# Patient Record
Sex: MALE | Race: WHITE | NOT HISPANIC OR LATINO | URBAN - METROPOLITAN AREA
[De-identification: names, ages, dates, MRNs, and addresses within clinical notes are randomized per-mention and may not be internally consistent; named-entity substitution may affect disease eponyms.]

---

## 2024-01-11 ENCOUNTER — OFFICE VISIT (OUTPATIENT)
Dept: URBAN - METROPOLITAN AREA CLINIC 80 | Facility: CLINIC | Age: 34
End: 2024-01-11
Payer: COMMERCIAL

## 2024-01-11 ENCOUNTER — DASHBOARD ENCOUNTERS (OUTPATIENT)
Age: 34
End: 2024-01-11

## 2024-01-11 VITALS
HEART RATE: 90 BPM | SYSTOLIC BLOOD PRESSURE: 125 MMHG | DIASTOLIC BLOOD PRESSURE: 83 MMHG | HEIGHT: 73 IN | WEIGHT: 185.1 LBS | TEMPERATURE: 97.3 F | BODY MASS INDEX: 24.53 KG/M2

## 2024-01-11 DIAGNOSIS — R19.5 LOOSE STOOLS: ICD-10-CM

## 2024-01-11 DIAGNOSIS — R30.0 DYSURIA: ICD-10-CM

## 2024-01-11 DIAGNOSIS — K64.0 GRADE I HEMORRHOIDS: ICD-10-CM

## 2024-01-11 PROCEDURE — 99203 OFFICE O/P NEW LOW 30 MIN: CPT | Performed by: INTERNAL MEDICINE

## 2024-01-11 RX ORDER — HYDROCORTISONE ACETATE 25 MG/1
1 SUPPOSITORY SUPPOSITORY RECTAL TWICE A DAY
Qty: 60 | Refills: 1 | OUTPATIENT
Start: 2024-01-11 | End: 2024-03-11

## 2024-01-11 NOTE — HPI-TODAY'S VISIT:
6 m ago Colonoscopy in [place], at the time was having some bleeding on toilet paper. Had tried tucks. Found to have anal fissure and Hemorrhoids.  Still having issues.  Treatment: was given cream. Normal BM usually several times each morning: from solid to soft serve and rarely liquid. Occ urgent/discomfort with urination

## 2024-01-11 NOTE — PHYSICAL EXAM GASTROINTESTINAL
Abdomen , soft, nontender, nondistended , no guarding or rigidity , no masses palpable , normal bowel sounds , Liver and Spleen,  no hepatosplenomegaly , liver nontender Small IH. Query healed ant fissure

## 2024-01-13 LAB
A/G RATIO: 2
ALBUMIN: 4.7
ALKALINE PHOSPHATASE: 90
ALT (SGPT): 48
APPEARANCE: CLEAR
AST (SGOT): 26
BACTERIA: (no result)
BILIRUBIN, TOTAL: 0.6
BILIRUBIN: NEGATIVE
BUN/CREATININE RATIO: (no result)
BUN: 13
CALCIUM: 9.5
CARBON DIOXIDE, TOTAL: 23
CHLORIDE: 102
COLOR: YELLOW
CREATININE: 0.94
EGFR: 108
GLOBULIN, TOTAL: 2.4
GLUCOSE: 78
GLUCOSE: NEGATIVE
HEMATOCRIT: 47.7
HEMOGLOBIN: 15.8
HYALINE CAST: (no result)
IMMUNOGLOBULIN A: 109
INTERPRETATION: (no result)
KETONES: NEGATIVE
LEUKOCYTE ESTERASE: NEGATIVE
MCH: 26.5
MCHC: 33.1
MCV: 79.9
MPV: 10.5
NITRITE: NEGATIVE
OCCULT BLOOD: NEGATIVE
PH: 7
PLATELET COUNT: 280
POTASSIUM: 4.5
PROTEIN, TOTAL: 7.1
PROTEIN: NEGATIVE
RBC: (no result)
RDW: 13.4
RED BLOOD CELL COUNT: 5.97
SODIUM: 138
SPECIFIC GRAVITY: 1.01
SQUAMOUS EPITHELIAL CELLS: (no result)
TISSUE TRANSGLUTAMINASE AB, IGA: <1
TSH W/REFLEX TO FT4: 0.83
WBC: (no result)
WHITE BLOOD CELL COUNT: 5.8

## 2024-01-16 ENCOUNTER — TELEPHONE ENCOUNTER (OUTPATIENT)
Dept: URBAN - METROPOLITAN AREA CLINIC 80 | Facility: CLINIC | Age: 34
End: 2024-01-16

## 2024-01-30 LAB
ADENOVIRUS F 40/41: NOT DETECTED
CAMPYLOBACTER: NOT DETECTED
CLOSTRIDIUM DIFFICILE: NOT DETECTED
ENTAMOEBA HISTOLYTICA: NOT DETECTED
ENTEROAGGREGATIVE E.COLI: NOT DETECTED
ENTEROTOXIGENIC E.COLI: NOT DETECTED
ESCHERICHIA COLI O157: NOT DETECTED
GIARDIA LAMBLIA: NOT DETECTED
NOROVIRUS GI/GII: NOT DETECTED
PANCREATICELASTASE ELISA, STOOL: (no result)
ROTAVIRUS A: NOT DETECTED
SALMONELLA SPP.: NOT DETECTED
SHIGA-LIKE TOXIN PRODUCING E.COLI: NOT DETECTED
SHIGELLA SPP. / ENTEROINVASIVE E.COLI: NOT DETECTED
VIBRIO PARAHAEMOLYTICUS: NOT DETECTED
VIBRIO SPP.: NOT DETECTED
YERSINIA ENTEROCOLITICA: NOT DETECTED